# Patient Record
Sex: MALE | Race: WHITE | NOT HISPANIC OR LATINO | Employment: FULL TIME | URBAN - NONMETROPOLITAN AREA
[De-identification: names, ages, dates, MRNs, and addresses within clinical notes are randomized per-mention and may not be internally consistent; named-entity substitution may affect disease eponyms.]

---

## 2017-03-10 ENCOUNTER — ANESTHESIA (OUTPATIENT)
Dept: PERIOP | Facility: HOSPITAL | Age: 32
End: 2017-03-10

## 2017-03-10 ENCOUNTER — APPOINTMENT (OUTPATIENT)
Dept: GENERAL RADIOLOGY | Facility: HOSPITAL | Age: 32
End: 2017-03-10

## 2017-03-10 ENCOUNTER — APPOINTMENT (OUTPATIENT)
Dept: ULTRASOUND IMAGING | Facility: HOSPITAL | Age: 32
End: 2017-03-10

## 2017-03-10 ENCOUNTER — APPOINTMENT (OUTPATIENT)
Dept: CT IMAGING | Facility: HOSPITAL | Age: 32
End: 2017-03-10

## 2017-03-10 ENCOUNTER — HOSPITAL ENCOUNTER (INPATIENT)
Facility: HOSPITAL | Age: 32
LOS: 1 days | Discharge: HOME | End: 2017-03-11
Attending: FAMILY MEDICINE | Admitting: SURGERY

## 2017-03-10 ENCOUNTER — ANESTHESIA EVENT (OUTPATIENT)
Dept: PERIOP | Facility: HOSPITAL | Age: 32
End: 2017-03-10

## 2017-03-10 DIAGNOSIS — K81.0 ACUTE CHOLECYSTITIS: Primary | ICD-10-CM

## 2017-03-10 DIAGNOSIS — K80.00 CALCULUS OF GALLBLADDER WITH ACUTE CHOLECYSTITIS WITHOUT OBSTRUCTION: ICD-10-CM

## 2017-03-10 LAB
ALBUMIN SERPL-MCNC: 4.7 G/DL (ref 3.4–4.8)
ALBUMIN/GLOB SERPL: 1.7 G/DL (ref 1.1–1.8)
ALP SERPL-CCNC: 95 U/L (ref 38–126)
ALT SERPL W P-5'-P-CCNC: 60 U/L (ref 21–72)
AMPHET+METHAMPHET UR QL: NEGATIVE
ANION GAP SERPL CALCULATED.3IONS-SCNC: 13 MMOL/L (ref 5–15)
AST SERPL-CCNC: 30 U/L (ref 17–59)
BARBITURATES UR QL SCN: NEGATIVE
BASOPHILS # BLD AUTO: 0.01 10*3/MM3 (ref 0–0.2)
BASOPHILS NFR BLD AUTO: 0.1 % (ref 0–2)
BENZODIAZ UR QL SCN: NEGATIVE
BILIRUB SERPL-MCNC: 1.6 MG/DL (ref 0.2–1.3)
BUN BLD-MCNC: 15 MG/DL (ref 7–21)
BUN/CREAT SERPL: 19.5 (ref 7–25)
CALCIUM SPEC-SCNC: 9.7 MG/DL (ref 8.4–10.2)
CANNABINOIDS SERPL QL: NEGATIVE
CHLORIDE SERPL-SCNC: 100 MMOL/L (ref 95–110)
CO2 SERPL-SCNC: 26 MMOL/L (ref 22–31)
COCAINE UR QL: NEGATIVE
CREAT BLD-MCNC: 0.77 MG/DL (ref 0.7–1.3)
DEPRECATED RDW RBC AUTO: 38.8 FL (ref 35.1–43.9)
EOSINOPHIL # BLD AUTO: 0.01 10*3/MM3 (ref 0–0.7)
EOSINOPHIL NFR BLD AUTO: 0.1 % (ref 0–7)
ERYTHROCYTE [DISTWIDTH] IN BLOOD BY AUTOMATED COUNT: 12.9 % (ref 11.5–14.5)
ETHANOL BLD-MCNC: <10 MG/DL (ref 0–10)
ETHANOL UR QL: <0.01 %
GFR SERPL CREATININE-BSD FRML MDRD: 116 ML/MIN/1.73 (ref 70–162)
GLOBULIN UR ELPH-MCNC: 2.8 GM/DL (ref 2.3–3.5)
GLUCOSE BLD-MCNC: 130 MG/DL (ref 60–100)
HCT VFR BLD AUTO: 39.9 % (ref 39–49)
HGB BLD-MCNC: 13.8 G/DL (ref 13.7–17.3)
HOLD SPECIMEN: NORMAL
HOLD SPECIMEN: NORMAL
IMM GRANULOCYTES # BLD: 0.02 10*3/MM3 (ref 0–0.02)
IMM GRANULOCYTES NFR BLD: 0.2 % (ref 0–0.5)
LIPASE SERPL-CCNC: 47 U/L (ref 23–300)
LYMPHOCYTES # BLD AUTO: 0.96 10*3/MM3 (ref 0.6–4.2)
LYMPHOCYTES NFR BLD AUTO: 8.1 % (ref 10–50)
MCH RBC QN AUTO: 28.3 PG (ref 26.5–34)
MCHC RBC AUTO-ENTMCNC: 34.6 G/DL (ref 31.5–36.3)
MCV RBC AUTO: 81.8 FL (ref 80–98)
METHADONE UR QL SCN: NEGATIVE
MONOCYTES # BLD AUTO: 0.36 10*3/MM3 (ref 0–0.9)
MONOCYTES NFR BLD AUTO: 3.1 % (ref 0–12)
NEUTROPHILS # BLD AUTO: 10.42 10*3/MM3 (ref 2–8.6)
NEUTROPHILS NFR BLD AUTO: 88.4 % (ref 37–80)
OPIATES UR QL: POSITIVE
OXYCODONE UR QL SCN: NEGATIVE
PLATELET # BLD AUTO: 224 10*3/MM3 (ref 150–450)
PMV BLD AUTO: 10 FL (ref 8–12)
POTASSIUM BLD-SCNC: 4.4 MMOL/L (ref 3.5–5.1)
PROT SERPL-MCNC: 7.5 G/DL (ref 6.3–8.6)
RBC # BLD AUTO: 4.88 10*6/MM3 (ref 4.37–5.74)
SODIUM BLD-SCNC: 139 MMOL/L (ref 137–145)
WBC NRBC COR # BLD: 11.78 10*3/MM3 (ref 3.2–9.8)
WHOLE BLOOD HOLD SPECIMEN: NORMAL
WHOLE BLOOD HOLD SPECIMEN: NORMAL

## 2017-03-10 PROCEDURE — 25010000002 KETOROLAC TROMETHAMINE PER 15 MG: Performed by: NURSE PRACTITIONER

## 2017-03-10 PROCEDURE — 80307 DRUG TEST PRSMV CHEM ANLYZR: CPT | Performed by: NURSE PRACTITIONER

## 2017-03-10 PROCEDURE — 85025 COMPLETE CBC W/AUTO DIFF WBC: CPT | Performed by: FAMILY MEDICINE

## 2017-03-10 PROCEDURE — 93005 ELECTROCARDIOGRAM TRACING: CPT | Performed by: NURSE PRACTITIONER

## 2017-03-10 PROCEDURE — 0FT44ZZ RESECTION OF GALLBLADDER, PERCUTANEOUS ENDOSCOPIC APPROACH: ICD-10-PCS | Performed by: SURGERY

## 2017-03-10 PROCEDURE — 25010000002 NEOSTIGMINE PER 0.5 MG: Performed by: NURSE ANESTHETIST, CERTIFIED REGISTERED

## 2017-03-10 PROCEDURE — 76000 FLUOROSCOPY <1 HR PHYS/QHP: CPT

## 2017-03-10 PROCEDURE — 25010000002 SUCCINYLCHOLINE PER 20 MG: Performed by: NURSE ANESTHETIST, CERTIFIED REGISTERED

## 2017-03-10 PROCEDURE — 99284 EMERGENCY DEPT VISIT MOD MDM: CPT

## 2017-03-10 PROCEDURE — 25010000002 MIDAZOLAM PER 1 MG: Performed by: NURSE ANESTHETIST, CERTIFIED REGISTERED

## 2017-03-10 PROCEDURE — 47563 LAPARO CHOLECYSTECTOMY/GRAPH: CPT | Performed by: SURGERY

## 2017-03-10 PROCEDURE — 76705 ECHO EXAM OF ABDOMEN: CPT

## 2017-03-10 PROCEDURE — 88304 TISSUE EXAM BY PATHOLOGIST: CPT | Performed by: SURGERY

## 2017-03-10 PROCEDURE — 71020 HC CHEST PA AND LATERAL: CPT

## 2017-03-10 PROCEDURE — 74176 CT ABD & PELVIS W/O CONTRAST: CPT

## 2017-03-10 PROCEDURE — 25010000002 FENTANYL CITRATE (PF) 100 MCG/2ML SOLUTION: Performed by: NURSE ANESTHETIST, CERTIFIED REGISTERED

## 2017-03-10 PROCEDURE — 0 IOPAMIDOL 61 % SOLUTION: Performed by: SURGERY

## 2017-03-10 PROCEDURE — 80307 DRUG TEST PRSMV CHEM ANLYZR: CPT | Performed by: FAMILY MEDICINE

## 2017-03-10 PROCEDURE — 99219 PR INITIAL OBSERVATION CARE/DAY 50 MINUTES: CPT | Performed by: SURGERY

## 2017-03-10 PROCEDURE — 25010000002 HYDROMORPHONE PER 4 MG: Performed by: NURSE ANESTHETIST, CERTIFIED REGISTERED

## 2017-03-10 PROCEDURE — 25010000002 ONDANSETRON PER 1 MG: Performed by: NURSE PRACTITIONER

## 2017-03-10 PROCEDURE — 93010 ELECTROCARDIOGRAM REPORT: CPT | Performed by: INTERNAL MEDICINE

## 2017-03-10 PROCEDURE — 25010000002 ONDANSETRON PER 1 MG: Performed by: NURSE ANESTHETIST, CERTIFIED REGISTERED

## 2017-03-10 PROCEDURE — 25010000002 CEFOXITIN: Performed by: SURGERY

## 2017-03-10 PROCEDURE — BF101ZZ FLUOROSCOPY OF BILE DUCTS USING LOW OSMOLAR CONTRAST: ICD-10-PCS | Performed by: SURGERY

## 2017-03-10 PROCEDURE — 88304 TISSUE EXAM BY PATHOLOGIST: CPT | Performed by: PATHOLOGY

## 2017-03-10 PROCEDURE — 80053 COMPREHEN METABOLIC PANEL: CPT | Performed by: FAMILY MEDICINE

## 2017-03-10 PROCEDURE — 25010000002 PROPOFOL 10 MG/ML EMULSION: Performed by: NURSE ANESTHETIST, CERTIFIED REGISTERED

## 2017-03-10 PROCEDURE — 83690 ASSAY OF LIPASE: CPT | Performed by: FAMILY MEDICINE

## 2017-03-10 RX ORDER — SODIUM CHLORIDE 0.9 % (FLUSH) 0.9 %
1-10 SYRINGE (ML) INJECTION AS NEEDED
Status: DISCONTINUED | OUTPATIENT
Start: 2017-03-10 | End: 2017-03-11 | Stop reason: HOSPADM

## 2017-03-10 RX ORDER — ATROPINE SULFATE 1 MG/ML
INJECTION, SOLUTION INTRAMUSCULAR; INTRAVENOUS; SUBCUTANEOUS AS NEEDED
Status: DISCONTINUED | OUTPATIENT
Start: 2017-03-10 | End: 2017-03-10 | Stop reason: SURG

## 2017-03-10 RX ORDER — ATROPINE SULFATE 1 MG/ML
INJECTION, SOLUTION INTRAMUSCULAR; INTRAVENOUS; SUBCUTANEOUS AS NEEDED
Status: DISCONTINUED | OUTPATIENT
Start: 2017-03-10 | End: 2017-03-10

## 2017-03-10 RX ORDER — HYDRALAZINE HYDROCHLORIDE 20 MG/ML
5 INJECTION INTRAMUSCULAR; INTRAVENOUS
Status: DISCONTINUED | OUTPATIENT
Start: 2017-03-10 | End: 2017-03-10 | Stop reason: HOSPADM

## 2017-03-10 RX ORDER — DEXAMETHASONE SODIUM PHOSPHATE 4 MG/ML
8 INJECTION, SOLUTION INTRA-ARTICULAR; INTRALESIONAL; INTRAMUSCULAR; INTRAVENOUS; SOFT TISSUE ONCE AS NEEDED
Status: DISCONTINUED | OUTPATIENT
Start: 2017-03-10 | End: 2017-03-10 | Stop reason: HOSPADM

## 2017-03-10 RX ORDER — DIPHENHYDRAMINE HYDROCHLORIDE 50 MG/ML
12.5 INJECTION INTRAMUSCULAR; INTRAVENOUS
Status: DISCONTINUED | OUTPATIENT
Start: 2017-03-10 | End: 2017-03-10 | Stop reason: HOSPADM

## 2017-03-10 RX ORDER — HYDROCODONE BITARTRATE AND ACETAMINOPHEN 7.5; 325 MG/1; MG/1
1 TABLET ORAL EVERY 6 HOURS PRN
COMMUNITY
End: 2017-03-17 | Stop reason: SDUPTHER

## 2017-03-10 RX ORDER — LABETALOL HYDROCHLORIDE 5 MG/ML
5 INJECTION, SOLUTION INTRAVENOUS
Status: DISCONTINUED | OUTPATIENT
Start: 2017-03-10 | End: 2017-03-10 | Stop reason: HOSPADM

## 2017-03-10 RX ORDER — HYDROMORPHONE HCL 110MG/55ML
PATIENT CONTROLLED ANALGESIA SYRINGE INTRAVENOUS AS NEEDED
Status: DISCONTINUED | OUTPATIENT
Start: 2017-03-10 | End: 2017-03-10 | Stop reason: SURG

## 2017-03-10 RX ORDER — ACETAMINOPHEN 325 MG/1
650 TABLET ORAL ONCE AS NEEDED
Status: DISCONTINUED | OUTPATIENT
Start: 2017-03-10 | End: 2017-03-10 | Stop reason: HOSPADM

## 2017-03-10 RX ORDER — NALOXONE HCL 0.4 MG/ML
0.4 VIAL (ML) INJECTION
Status: DISCONTINUED | OUTPATIENT
Start: 2017-03-10 | End: 2017-03-11 | Stop reason: HOSPADM

## 2017-03-10 RX ORDER — ROCURONIUM BROMIDE 10 MG/ML
INJECTION, SOLUTION INTRAVENOUS AS NEEDED
Status: DISCONTINUED | OUTPATIENT
Start: 2017-03-10 | End: 2017-03-10 | Stop reason: SURG

## 2017-03-10 RX ORDER — BACTERIOSTATIC SODIUM CHLORIDE 0.9 %
VIAL (ML) INJECTION AS NEEDED
Status: DISCONTINUED | OUTPATIENT
Start: 2017-03-10 | End: 2017-03-11 | Stop reason: HOSPADM

## 2017-03-10 RX ORDER — BUPIVACAINE HYDROCHLORIDE AND EPINEPHRINE 5; 5 MG/ML; UG/ML
INJECTION, SOLUTION EPIDURAL; INTRACAUDAL; PERINEURAL AS NEEDED
Status: DISCONTINUED | OUTPATIENT
Start: 2017-03-10 | End: 2017-03-11 | Stop reason: HOSPADM

## 2017-03-10 RX ORDER — SODIUM CHLORIDE 0.9 % (FLUSH) 0.9 %
10 SYRINGE (ML) INJECTION AS NEEDED
Status: DISCONTINUED | OUTPATIENT
Start: 2017-03-10 | End: 2017-03-11 | Stop reason: HOSPADM

## 2017-03-10 RX ORDER — GABAPENTIN 300 MG/1
300 CAPSULE ORAL 2 TIMES DAILY PRN
COMMUNITY
End: 2019-06-21

## 2017-03-10 RX ORDER — PROPOFOL 10 MG/ML
VIAL (ML) INTRAVENOUS AS NEEDED
Status: DISCONTINUED | OUTPATIENT
Start: 2017-03-10 | End: 2017-03-10 | Stop reason: SURG

## 2017-03-10 RX ORDER — ONDANSETRON 2 MG/ML
4 INJECTION INTRAMUSCULAR; INTRAVENOUS ONCE
Status: COMPLETED | OUTPATIENT
Start: 2017-03-10 | End: 2017-03-10

## 2017-03-10 RX ORDER — FENTANYL CITRATE 50 UG/ML
INJECTION, SOLUTION INTRAMUSCULAR; INTRAVENOUS AS NEEDED
Status: DISCONTINUED | OUTPATIENT
Start: 2017-03-10 | End: 2017-03-10 | Stop reason: SURG

## 2017-03-10 RX ORDER — MIDAZOLAM HYDROCHLORIDE 1 MG/ML
INJECTION INTRAMUSCULAR; INTRAVENOUS AS NEEDED
Status: DISCONTINUED | OUTPATIENT
Start: 2017-03-10 | End: 2017-03-10 | Stop reason: SURG

## 2017-03-10 RX ORDER — NALOXONE HCL 0.4 MG/ML
0.2 VIAL (ML) INJECTION AS NEEDED
Status: DISCONTINUED | OUTPATIENT
Start: 2017-03-10 | End: 2017-03-10 | Stop reason: HOSPADM

## 2017-03-10 RX ORDER — DEXTROSE, SODIUM CHLORIDE, AND POTASSIUM CHLORIDE 5; .45; .15 G/100ML; G/100ML; G/100ML
100 INJECTION INTRAVENOUS CONTINUOUS
Status: DISCONTINUED | OUTPATIENT
Start: 2017-03-10 | End: 2017-03-11 | Stop reason: HOSPADM

## 2017-03-10 RX ORDER — LIDOCAINE HYDROCHLORIDE 20 MG/ML
INJECTION, SOLUTION INFILTRATION; PERINEURAL AS NEEDED
Status: DISCONTINUED | OUTPATIENT
Start: 2017-03-10 | End: 2017-03-10 | Stop reason: SURG

## 2017-03-10 RX ORDER — FLUMAZENIL 0.1 MG/ML
0.2 INJECTION INTRAVENOUS AS NEEDED
Status: DISCONTINUED | OUTPATIENT
Start: 2017-03-10 | End: 2017-03-10 | Stop reason: HOSPADM

## 2017-03-10 RX ORDER — GLYCOPYRROLATE 0.2 MG/ML
INJECTION INTRAMUSCULAR; INTRAVENOUS AS NEEDED
Status: DISCONTINUED | OUTPATIENT
Start: 2017-03-10 | End: 2017-03-10 | Stop reason: SURG

## 2017-03-10 RX ORDER — ONDANSETRON 2 MG/ML
INJECTION INTRAMUSCULAR; INTRAVENOUS AS NEEDED
Status: DISCONTINUED | OUTPATIENT
Start: 2017-03-10 | End: 2017-03-10 | Stop reason: SURG

## 2017-03-10 RX ORDER — HYDROCODONE BITARTRATE AND ACETAMINOPHEN 7.5; 325 MG/1; MG/1
2 TABLET ORAL EVERY 6 HOURS PRN
Status: DISCONTINUED | OUTPATIENT
Start: 2017-03-10 | End: 2017-03-11 | Stop reason: HOSPADM

## 2017-03-10 RX ORDER — SODIUM CHLORIDE, SODIUM GLUCONATE, SODIUM ACETATE, POTASSIUM CHLORIDE, AND MAGNESIUM CHLORIDE 526; 502; 368; 37; 30 MG/100ML; MG/100ML; MG/100ML; MG/100ML; MG/100ML
INJECTION, SOLUTION INTRAVENOUS CONTINUOUS PRN
Status: DISCONTINUED | OUTPATIENT
Start: 2017-03-10 | End: 2017-03-10 | Stop reason: SURG

## 2017-03-10 RX ORDER — KETOROLAC TROMETHAMINE 30 MG/ML
30 INJECTION, SOLUTION INTRAMUSCULAR; INTRAVENOUS ONCE
Status: COMPLETED | OUTPATIENT
Start: 2017-03-10 | End: 2017-03-10

## 2017-03-10 RX ORDER — ACETAMINOPHEN 650 MG/1
650 SUPPOSITORY RECTAL ONCE AS NEEDED
Status: DISCONTINUED | OUTPATIENT
Start: 2017-03-10 | End: 2017-03-10 | Stop reason: HOSPADM

## 2017-03-10 RX ORDER — SUCCINYLCHOLINE CHLORIDE 20 MG/ML
INJECTION INTRAMUSCULAR; INTRAVENOUS AS NEEDED
Status: DISCONTINUED | OUTPATIENT
Start: 2017-03-10 | End: 2017-03-10 | Stop reason: SURG

## 2017-03-10 RX ORDER — METOCLOPRAMIDE HYDROCHLORIDE 5 MG/ML
10 INJECTION INTRAMUSCULAR; INTRAVENOUS ONCE AS NEEDED
Status: DISCONTINUED | OUTPATIENT
Start: 2017-03-10 | End: 2017-03-10 | Stop reason: HOSPADM

## 2017-03-10 RX ORDER — ONDANSETRON 2 MG/ML
4 INJECTION INTRAMUSCULAR; INTRAVENOUS ONCE AS NEEDED
Status: DISCONTINUED | OUTPATIENT
Start: 2017-03-10 | End: 2017-03-10 | Stop reason: HOSPADM

## 2017-03-10 RX ORDER — ONDANSETRON 2 MG/ML
4 INJECTION INTRAMUSCULAR; INTRAVENOUS EVERY 6 HOURS PRN
Status: DISCONTINUED | OUTPATIENT
Start: 2017-03-10 | End: 2017-03-11 | Stop reason: HOSPADM

## 2017-03-10 RX ORDER — MORPHINE SULFATE 4 MG/ML
4 INJECTION, SOLUTION INTRAMUSCULAR; INTRAVENOUS EVERY 4 HOURS PRN
Status: DISCONTINUED | OUTPATIENT
Start: 2017-03-10 | End: 2017-03-11 | Stop reason: HOSPADM

## 2017-03-10 RX ADMIN — PROPOFOL 200 MG: 10 INJECTION, EMULSION INTRAVENOUS at 16:02

## 2017-03-10 RX ADMIN — CEFOXITIN 2 G: 2 INJECTION, POWDER, FOR SOLUTION INTRAVENOUS at 22:20

## 2017-03-10 RX ADMIN — HYDROMORPHONE HYDROCHLORIDE 0.5 MG: 2 INJECTION, SOLUTION INTRAMUSCULAR; INTRAVENOUS; SUBCUTANEOUS at 17:10

## 2017-03-10 RX ADMIN — SODIUM CHLORIDE 600 ML: 900 INJECTION, SOLUTION INTRAVENOUS at 16:40

## 2017-03-10 RX ADMIN — ROCURONIUM BROMIDE 5 MG: 10 INJECTION INTRAVENOUS at 16:00

## 2017-03-10 RX ADMIN — SODIUM CHLORIDE, SODIUM GLUCONATE, SODIUM ACETATE, POTASSIUM CHLORIDE, AND MAGNESIUM CHLORIDE: 526; 502; 368; 37; 30 INJECTION, SOLUTION INTRAVENOUS at 17:05

## 2017-03-10 RX ADMIN — SODIUM CHLORIDE 100 ML: 900 INJECTION, SOLUTION INTRAVENOUS at 15:59

## 2017-03-10 RX ADMIN — ONDANSETRON 4 MG: 2 INJECTION INTRAMUSCULAR; INTRAVENOUS at 12:57

## 2017-03-10 RX ADMIN — ONDANSETRON 4 MG: 2 INJECTION INTRAMUSCULAR; INTRAVENOUS at 16:10

## 2017-03-10 RX ADMIN — FENTANYL CITRATE 100 MCG: 50 INJECTION, SOLUTION INTRAMUSCULAR; INTRAVENOUS at 16:20

## 2017-03-10 RX ADMIN — MIDAZOLAM 2 MG: 1 INJECTION INTRAMUSCULAR; INTRAVENOUS at 16:02

## 2017-03-10 RX ADMIN — SODIUM CHLORIDE 200 ML: 900 INJECTION, SOLUTION INTRAVENOUS at 17:00

## 2017-03-10 RX ADMIN — FENTANYL CITRATE 150 MCG: 50 INJECTION, SOLUTION INTRAMUSCULAR; INTRAVENOUS at 16:02

## 2017-03-10 RX ADMIN — POTASSIUM CHLORIDE, DEXTROSE MONOHYDRATE AND SODIUM CHLORIDE 100 ML/HR: 150; 5; 450 INJECTION, SOLUTION INTRAVENOUS at 21:28

## 2017-03-10 RX ADMIN — ATROPINE SULFATE 1 MG: 1 INJECTION, SOLUTION INTRAMUSCULAR; INTRAVENOUS; SUBCUTANEOUS at 16:18

## 2017-03-10 RX ADMIN — SODIUM CHLORIDE 100 ML: 900 INJECTION, SOLUTION INTRAVENOUS at 17:05

## 2017-03-10 RX ADMIN — GLYCOPYRROLATE 0.2 MG: 0.2 INJECTION, SOLUTION INTRAMUSCULAR; INTRAVENOUS at 16:15

## 2017-03-10 RX ADMIN — NEOSTIGMINE METHYLSULFATE 3 MG: 1 INJECTION, SOLUTION INTRAMUSCULAR; INTRAVENOUS; SUBCUTANEOUS at 17:00

## 2017-03-10 RX ADMIN — HYDROCODONE BITARTRATE AND ACETAMINOPHEN 2 TABLET: 7.5; 325 TABLET ORAL at 21:34

## 2017-03-10 RX ADMIN — ROCURONIUM BROMIDE 10 MG: 10 INJECTION INTRAVENOUS at 16:23

## 2017-03-10 RX ADMIN — SODIUM CHLORIDE 1000 ML: 900 INJECTION, SOLUTION INTRAVENOUS at 12:57

## 2017-03-10 RX ADMIN — LIDOCAINE HYDROCHLORIDE 80 MG: 20 INJECTION, SOLUTION INFILTRATION; PERINEURAL at 16:02

## 2017-03-10 RX ADMIN — SUCCINYLCHOLINE CHLORIDE 180 MG: 20 INJECTION, SOLUTION INTRAMUSCULAR; INTRAVENOUS at 16:02

## 2017-03-10 RX ADMIN — HYDROMORPHONE HYDROCHLORIDE 0.5 MG: 2 INJECTION, SOLUTION INTRAMUSCULAR; INTRAVENOUS; SUBCUTANEOUS at 17:05

## 2017-03-10 RX ADMIN — ROCURONIUM BROMIDE 30 MG: 10 INJECTION INTRAVENOUS at 16:10

## 2017-03-10 RX ADMIN — KETOROLAC TROMETHAMINE 30 MG: 30 INJECTION, SOLUTION INTRAMUSCULAR; INTRAVENOUS at 12:57

## 2017-03-10 RX ADMIN — GLYCOPYRROLATE 0.4 MG: 0.2 INJECTION, SOLUTION INTRAMUSCULAR; INTRAVENOUS at 17:00

## 2017-03-10 NOTE — H&P
Patient Care Team:  No Known Provider as PCP - General  No ref. provider found  Chief complaint abdominal pain.    Subjective     Patient is a 32 y.o. male presents with 12 hour history of severe right upper quadrant abdominal pain.  This is never happened before.  He has no nausea or vomiting.  He is not hungry.  The pain coming goes to his back and flank.  It is a constant pain.  Pain medication has helped the pain some.  No change in his bowel function.    Review of Systems   Review of Systems   Constitutional: Negative.    HENT: Negative.    Eyes: Negative.    Respiratory: Negative.    Cardiovascular: Negative.    Gastrointestinal: Positive for abdominal pain.   Endocrine: Negative.    Genitourinary: Negative.    Musculoskeletal: Negative.    Skin: Negative.    Allergic/Immunologic: Negative.    Neurological: Negative.    Hematological: Negative.    Psychiatric/Behavioral: Negative.      History  History reviewed. No pertinent past medical history.  Past Surgical History   Procedure Laterality Date   • Orbital fracture surgery       History reviewed. No pertinent family history.  Social History   Substance Use Topics   • Smoking status: Current Every Day Smoker     Packs/day: 0.50   • Smokeless tobacco: None   • Alcohol use 7.2 oz/week     12 Cans of beer per week       (Not in a hospital admission)  Allergies:  Review of patient's allergies indicates no known allergies.    Objective     Vital Signs  Temp:  [97.7 °F (36.5 °C)] 97.7 °F (36.5 °C)  Heart Rate:  [48-52] 52  Resp:  [16-18] 18  BP: (132-146)/(67-83) 132/67    Physical Exam:    Physical Exam   Constitutional: He is oriented to person, place, and time. He appears well-developed.   HENT:   Head: Normocephalic and atraumatic.   Eyes: Pupils are equal, round, and reactive to light.   Neck: Normal range of motion. Neck supple.   Cardiovascular: Normal rate, regular rhythm, normal heart sounds and intact distal pulses.    Pulmonary/Chest: Effort normal  and breath sounds normal.   Abdominal: Soft. Bowel sounds are normal. There is tenderness.   Musculoskeletal: Normal range of motion.   Neurological: He is alert and oriented to person, place, and time.   Skin: Skin is warm and dry.   Psychiatric: He has a normal mood and affect. His behavior is normal.    tenderness to deep palpation in the right upper quadrant with a positive Parsons sign.    Results Review:      Lab Results (last 24 hours)     Procedure Component Value Units Date/Time    [place] Draw [[id number]] Collected:  03/10/17 1157    Specimen:  Blood Updated:  03/10/17 1200    Narrative:       The following orders were created for panel order [place] Draw.  Procedure                               Abnormality         Status                     ---------                               -----------         ------                     Light Blue Top[[id number]]                                    In process                 Green Top (Gel)[[id number]]                                   In process                 Lavender Top[[id number]]                                      In process                 Gold Top - SST[[id number]]                                    In process                   Please view results for these tests on the individual orders.    Light Blue Top [[id number]] Collected:  03/10/17 1157    Specimen:  Blood Updated:  03/10/17 1200    Green Top (Gel) [[id number]] Collected:  03/10/17 1157    Specimen:  Blood Updated:  03/10/17 1200    Gold Top - SST [[id number]] Collected:  03/10/17 1157    Specimen:  Blood Updated:  03/10/17 1200    Lavender Top [[id number]] Collected:  03/10/17 1157    Specimen:  Blood Updated:  03/10/17 1200    CBC & Differential [[id number]] Collected:  03/10/17 1157    Specimen:  Blood Updated:  03/10/17 1202    Narrative:       The following orders were created for panel order CBC & Differential.  Procedure                               Abnormality         Status                     ---------                                -----------         ------                     CBC Auto Differential[[id number]]         Abnormal            Final result                 Please view results for these tests on the individual orders.    CBC Auto Differential [[id number]]  (Abnormal) Collected:  03/10/17 1157    Specimen:  Blood Updated:  03/10/17 1202     WBC 11.78 (H) 10*3/mm3      RBC 4.88 10*6/mm3      Hemoglobin 13.8 g/dL      Hematocrit 39.9 %      MCV 81.8 fL      MCH 28.3 pg      MCHC 34.6 g/dL      RDW 12.9 %      RDW-SD 38.8 fl      MPV 10.0 fL      Platelets 224 10*3/mm3      Neutrophil % 88.4 (H) %      Lymphocyte % 8.1 (L) %      Monocyte % 3.1 %      Eosinophil % 0.1 %      Basophil % 0.1 %      Immature Grans % 0.2 %      Neutrophils, Absolute 10.42 (H) 10*3/mm3      Lymphocytes, Absolute 0.96 10*3/mm3      Monocytes, Absolute 0.36 10*3/mm3      Eosinophils, Absolute 0.01 10*3/mm3      Basophils, Absolute 0.01 10*3/mm3      Immature Grans, Absolute 0.02 10*3/mm3     Comprehensive Metabolic Panel [[id number]]  (Abnormal) Collected:  03/10/17 1157    Specimen:  Blood Updated:  03/10/17 1227     Glucose 130 (H) mg/dL      BUN 15 mg/dL      Creatinine 0.77 mg/dL      Sodium 139 mmol/L      Potassium 4.4 mmol/L      Chloride 100 mmol/L      CO2 26.0 mmol/L      Calcium 9.7 mg/dL      Total Protein 7.5 g/dL      Albumin 4.70 g/dL      ALT (SGPT) 60 U/L      AST (SGOT) 30 U/L      Alkaline Phosphatase 95 U/L      Total Bilirubin 1.6 (H) mg/dL      eGFR Non African Amer 116 mL/min/1.73      Globulin 2.8 gm/dL      A/G Ratio 1.7 g/dL      BUN/Creatinine Ratio 19.5      Anion Gap 13.0 mmol/L     Lipase [[id number]]  (Normal) Collected:  03/10/17 1157    Specimen:  Blood Updated:  03/10/17 1227     Lipase 47 U/L     Urine Drug Screen [[id number]]  (Abnormal) Collected:  03/10/17 1258    Specimen:  Urine from Urine, Clean Catch Updated:  03/10/17 1333     Amphetamine Screen, Urine Negative      Barbiturates Screen, Urine Negative       Benzodiazepine Screen, Urine Negative      Cocaine Screen, Urine Negative      Methadone Screen, Urine Negative      Opiate Screen Positive (A)      Oxycodone Screen, Urine Negative      THC, Screen, Urine Negative     Narrative:       Negative Thresholds For Drugs Screened in Urine:     Amphetamines          500 ng/ml  Barbiturates          200 ng/ml  Benzodiazepines       200 ng/ml  Cocaine               150 ng/ml  Methadone             300 ng/mL  Opiates               300 ng/mL  Oxycodone             100 ng/mL  THC                   20 ng/mL    The normal value for all drugs tested is negative. This report includes final unconfirmed screening results.  A positive result by this assay can be, at your request, sent to the Reference Lab for confirmation by gas chromatography. Unconfirmed results must not be used for non-medical purposes, such as employment or legal testing. Clinical consideration should be applied to any drug of abuse test result, particularly when unconfirmed results are used.    Ethanol [[id number]] Collected:  03/10/17 1157    Specimen:  Blood Updated:  03/10/17 1425     Ethanol <10 mg/dL      Ethanol % <0.010 %        labs are significant of the total bilirubin 1.6.  CT scan is normal with exception of a possible cholelithiasis.  Next line gallbladder ultrasound was done to confirm and patient has multiple gallstones.  He has mild gallbladder wall thickening without any fluid.  He has a normal common duct diameter.    Assessment/Plan     Active Problems:    Acute cholecystitis    Mr. Kowalski is a 32-year-old gentleman presents with signs symptoms of acute cholecystitis.  We'll give him IV fluids IV antibiotics and taken to the OR for lap cholecystectomy.  He we will do a cholangiogram.  After unexpected at a 1 day hospital stay for symptom control.  The procedure and risks, benefits, and alternatives to the plan him discussed patient and he understands and agrees with the plan.    I discussed the  patients findings and my recommendations with patient and family.     Nelson Ware MD  03/10/17  3:16 PM

## 2017-03-10 NOTE — PLAN OF CARE
Problem: Patient Care Overview (Adult)  Goal: Plan of Care Review  Outcome: Ongoing (interventions implemented as appropriate)    03/10/17 1730   Coping/Psychosocial Response Interventions   Plan Of Care Reviewed With patient   Patient Care Overview   Progress improving   Outcome Evaluation   Outcome Summary/Follow up Plan VSS, pt meet pacu d/c criteria       Goal: Adult Individualization and Mutuality  Outcome: Ongoing (interventions implemented as appropriate)

## 2017-03-10 NOTE — ANESTHESIA PROCEDURE NOTES
Airway  Urgency: emergent    Airway not difficult    General Information and Staff    Patient location during procedure: OR  CRNA: NILA GARCES    Indications and Patient Condition  Indications for airway management: airway protection    Preoxygenated: yes  Mask difficulty assessment: 0 - not attempted    Final Airway Details  Final airway type: endotracheal airway      Successful airway: ETT  Cuffed: yes   Successful intubation technique: direct laryngoscopy  Facilitating devices/methods: intubating stylet and cricoid pressure  Blade: Aneta  Blade size: #4  ETT size: 7.5 mm  Cormack-Lehane Classification: grade I - full view of glottis  Placement verified by: chest auscultation and capnometry   Measured from: lips  ETT to lips (cm): 22  Number of attempts at approach: 1

## 2017-03-10 NOTE — OP NOTE
CHOLECYSTECTOMY LAPAROSCOPIC  Procedure Note    Tam Kowalski  3/10/2017    Pre-op Diagnosis:   Acute cholecystitis [K81.0]    Post-op Diagnosis:     Post-Op Diagnosis Codes:     * Acute cholecystitis [K81.0]    Procedure/CPT® Codes:  SD LAP,CHOLECYSTECTOMY/GRAPH [08519]    Procedure(s):  CHOLECYSTECTOMY LAPAROSCOPIC, INTRA OPERATIVE CHOLANGIOGRAM    Surgeon(s):  Nelson Ware MD    Anesthesia: General    Staff:   Circulator: Cheyenne Graves RN; Tabby Freeman RN  Scrub Person: Darleen Kowalski  Assistant: Naida Mina CSA    Estimated Blood Loss: * No values recorded between 3/10/2017  3:56 PM and 3/10/2017  5:22 PM *    Specimens:                  ID Type Source Tests Collected by Time Destination   A :  Tissue Gallbladder TISSUE EXAM Nelson Ware MD 3/10/2017 1703          Drains:           Findings: critical view of safety obtained, gallbladder hydrops, acute cholecystitis, IOC showed contrast in the duodenum and no stones    Complications: none     Op note: After consent was obtained patient was taken to the operating room where general endotracheal anesthesia was induced.  Perioperative antibiotics were given and SCDs were placed.  The abdomen was then prepped and draped in normal sterile fashion.  We performed a timeout.  We started with a supraumbilical Min technique.  This is successful and the trochar was placed and we achieved pneumoperitoneum.  We then inserted a camera and placed 3 additional 5 mm ports in the epigastric, right upper quadrant, and flank positions.  We then grasped the fundus and infundibulum of the gallbladder and began our dissection.  All peritoneum and fat was cleared from the critical structures.  The critical view of safety was obtained and the cystic duct, cystic artery, and cystic plate were all identified.  Cholangiogram was performed with a Delgado clamp with good picture and contast filling the duodenum.  Then placed clips once distally and twice proximally on both the  cystic artery and cystic duct and these were both transected.  The cystic duct was large so a endoloop was also used.  Next used the hook electrocautery to take the gallbladder off the liver bed.  This was done safely with minimal bleeding and bile spillage.  After that the gallbladder was placed in the Endo Catch bag and removed from the abdomen.  Then surveilled the rest of the abdomen and there is no excessive blood or bile noted.  The Morison's pouch was washed out and our surgical site looks clean.  The 3 trochars removed under direct vision and all trochars and instruments removed from the abdomen.  We then closed the umbilical site with 0 Vicryl sutures and skin was approximated with 4-0 Monocryl and skin affix.  The procedure terminated patient tolerated the procedure well.    Nelson Ware MD     Date: 3/10/2017  Time: 5:22 PM

## 2017-03-10 NOTE — PLAN OF CARE
Problem: Perioperative Period (Adult)  Goal: Signs and Symptoms of Listed Potential Problems Will be Absent or Manageable (Perioperative Period)  Outcome: Ongoing (interventions implemented as appropriate)    03/10/17 7880   Perioperative Period   Problems Assessed (Perioperative Period) all   Problems Present (Perioperative Period) none

## 2017-03-10 NOTE — ANESTHESIA PREPROCEDURE EVALUATION
Anesthesia Evaluation     Patient summary reviewed   NPO Status: > 8 hours   Airway   Mallampati: II  no difficulty expected  Dental - normal exam     Pulmonary - normal exam   Cardiovascular - normal exam        Neuro/Psych  GI/Hepatic/Renal/Endo      Musculoskeletal     (+) back pain,   Abdominal    Substance History      OB/GYN          Other                                    Anesthesia Plan    ASA 2     general     Anesthetic plan and risks discussed with patient.

## 2017-03-10 NOTE — ED PROVIDER NOTES
Subjective   Patient is a 32 y.o. male presenting with abdominal pain.   History provided by:  Patient  Abdominal Pain   Pain location:  RUQ  Pain quality: sharp    Pain radiation: right lateral chest.  Pain severity:  Severe  Onset quality:  Sudden  Duration:  1 day  Timing:  Constant  Progression:  Worsening  Chronicity:  New  Relieved by:  Nothing  Worsened by:  Nothing  Ineffective treatments:  None tried  Associated symptoms: nausea and vomiting    Associated symptoms: no chest pain, no chills, no constipation, no cough, no fatigue, no fever and no shortness of breath    Nausea:     Severity:  Moderate    Onset quality:  Sudden    Timing:  Intermittent    Progression:  Waxing and waning  Vomiting:     Severity:  Moderate    Duration:  1 day    Timing:  Constant    Progression:  Worsening (unable to keep anything down)      Review of Systems   Constitutional: Positive for appetite change (unable to keep anything down). Negative for activity change, chills, fatigue and fever.   Respiratory: Negative.  Negative for cough, shortness of breath and wheezing.    Cardiovascular: Negative for chest pain, palpitations and leg swelling.   Gastrointestinal: Positive for abdominal pain, nausea and vomiting. Negative for constipation.   Genitourinary: Negative.    Musculoskeletal: Negative.    Neurological: Negative.    Psychiatric/Behavioral: Negative.    All other systems reviewed and are negative.      History reviewed. No pertinent past medical history.    No Known Allergies    Past Surgical History   Procedure Laterality Date   • Orbital fracture surgery         History reviewed. No pertinent family history.    Social History     Social History   • Marital status:      Spouse name: N/A   • Number of children: N/A   • Years of education: N/A     Social History Main Topics   • Smoking status: Current Every Day Smoker     Packs/day: 0.50   • Smokeless tobacco: None   • Alcohol use 7.2 oz/week     12 Cans of beer per  week   • Drug use: No   • Sexual activity: Defer     Other Topics Concern   • None     Social History Narrative   • None           Objective   Physical Exam   Constitutional: He is oriented to person, place, and time.   Cardiovascular: Regular rhythm, normal heart sounds and intact distal pulses.  Bradycardia present.    Pulmonary/Chest: Effort normal and breath sounds normal.   Abdominal: Soft. Bowel sounds are normal. There is tenderness.       Musculoskeletal: Normal range of motion.   Neurological: He is alert and oriented to person, place, and time. He has normal reflexes.   Skin: Skin is warm and dry.   Psychiatric: He has a normal mood and affect. His behavior is normal. Judgment and thought content normal.   Nursing note and vitals reviewed.      ECG 12 Lead    Date/Time: 3/10/2017 12:36 PM  Performed by: DAMIR TURNER  Authorized by: DAMIR TURNER   Interpreted by physician  Comparison: not compared with previous ECG   Rhythm: sinus bradycardia                 ED Course  ED Course   Comment By Time   D/w Dr. Ware's nurse, he would come to see pt in the ED CHANA Noonan 03/10 7437   Dr. Ware reviewed CT and requested US. CHANA Noonan 03/10 1424   D/w Dr. Ware, he is going to take pt for surgery. CHANA Noonan 03/10 4471        Labs Reviewed   COMPREHENSIVE METABOLIC PANEL - Abnormal; Notable for the following:        Result Value    Glucose 130 (*)     Total Bilirubin 1.6 (*)     All other components within normal limits   CBC WITH AUTO DIFFERENTIAL - Abnormal; Notable for the following:     WBC 11.78 (*)     Neutrophil % 88.4 (*)     Lymphocyte % 8.1 (*)     Neutrophils, Absolute 10.42 (*)     All other components within normal limits   URINE DRUG SCREEN - Abnormal; Notable for the following:     Opiate Screen Positive (*)     All other components within normal limits    Narrative:     Negative Thresholds For Drugs Screened in Urine:     Amphetamines          500 ng/ml  Barbiturates          200  ng/ml  Benzodiazepines       200 ng/ml  Cocaine               150 ng/ml  Methadone             300 ng/mL  Opiates               300 ng/mL  Oxycodone             100 ng/mL  THC                   20 ng/mL    The normal value for all drugs tested is negative. This report includes final unconfirmed screening results.  A positive result by this assay can be, at your request, sent to the Reference Lab for confirmation by gas chromatography. Unconfirmed results must not be used for non-medical purposes, such as employment or legal testing. Clinical consideration should be applied to any drug of abuse test result, particularly when unconfirmed results are used.   LIPASE - Normal   RAINBOW DRAW    Narrative:     The following orders were created for panel order [place] Draw.  Procedure                               Abnormality         Status                     ---------                               -----------         ------                     Light Blue Top[[id number]]                                    Final result               Green Top (Gel)[[id number]]                                   Final result               Lavender Top[[id number]]                                      Final result               Gold Top - SST[[id number]]                                    Final result                 Please view results for these tests on the individual orders.   ETHANOL   URINALYSIS W/ CULTURE IF INDICATED   TISSUE EXAM   CBC AND DIFFERENTIAL    Narrative:     The following orders were created for panel order CBC & Differential.  Procedure                               Abnormality         Status                     ---------                               -----------         ------                     CBC Auto Differential[[id number]]         Abnormal            Final result                 Please view results for these tests on the individual orders.   LIGHT BLUE TOP   GREEN TOP   LAVENDER TOP   GOLD TOP - SST       US Abdomen Limited    Final Result   CONCLUSION: Cholelithiasis.      Electronically signed by:  Edison Machado MD  3/10/2017 3:17 PM CST   Workstation: [id number]      CT Abdomen Pelvis Without Contrast   Final Result   CONCLUSION:       1.  No CT evidence of acute intra-abdominal disease.   2.  Questionable cholelithiasis.      Electronically signed by:  Maria L Sams MD  3/10/2017 1:57 PM CST   Workstation: [email or web address]      XR Chest 2 View   Final Result   CONCLUSION: No evidence of active disease.      Electronically signed by:  Edison Machado MD  3/10/2017 12:56 PM CST   Workstation: [email or web address]      FL C Arm During Surgery    (Results Pending)               MDM  Number of Diagnoses or Management Options  Calculus of gallbladder with acute cholecystitis without obstruction: new and requires workup     Amount and/or Complexity of Data Reviewed  Clinical lab tests: reviewed  Tests in the radiology section of CPT®: reviewed  Tests in the medicine section of CPT®: reviewed  Discuss the patient with other providers: yes    Risk of Complications, Morbidity, and/or Mortality  Presenting problems: moderate  Diagnostic procedures: moderate  Management options: moderate    Patient Progress  Patient progress: stable      Final diagnoses:   Calculus of gallbladder with acute cholecystitis without obstruction            CHANA Noonan  03/10/17 3533

## 2017-03-10 NOTE — ANESTHESIA POSTPROCEDURE EVALUATION
Patient: Tam Kowalski    Procedure Summary     Date Anesthesia Start Anesthesia Stop Room / Location    03/10/17 1559 [id number]  [facility] OR 09 / [facility]       Procedure Diagnosis Surgeon Provider    CHOLECYSTECTOMY LAPAROSCOPIC, INTRA OPERATIVE CHOLANGIOGRAM (N/A Abdomen) Acute cholecystitis  (Acute cholecystitis [K81.0]) MD Lilia Lal MD          Anesthesia Type: general  Last vitals  BP      Temp      Pulse     Resp      SpO2        Post Anesthesia Care and Evaluation    Patient location during evaluation: PACU  Patient participation: complete - patient participated  Level of consciousness: awake and alert  Pain score: 0  Pain management: adequate  Airway patency: patent  Anesthetic complications: No anesthetic complications  PONV Status: none  Cardiovascular status: acceptable  Respiratory status: acceptable  Hydration status: acceptable

## 2017-03-11 VITALS
BODY MASS INDEX: 31.83 KG/M2 | OXYGEN SATURATION: 96 % | HEIGHT: 72 IN | SYSTOLIC BLOOD PRESSURE: 131 MMHG | DIASTOLIC BLOOD PRESSURE: 61 MMHG | WEIGHT: 235 LBS | TEMPERATURE: 96.5 F | RESPIRATION RATE: 18 BRPM | HEART RATE: 58 BPM

## 2017-03-11 PROBLEM — K80.00 CALCULUS OF GALLBLADDER WITH ACUTE CHOLECYSTITIS WITHOUT OBSTRUCTION: Status: RESOLVED | Noted: 2017-03-10 | Resolved: 2017-03-11

## 2017-03-11 PROBLEM — K81.0 ACUTE CHOLECYSTITIS: Status: RESOLVED | Noted: 2017-03-10 | Resolved: 2017-03-11

## 2017-03-11 LAB
ALBUMIN SERPL-MCNC: 3.7 G/DL (ref 3.4–4.8)
ALBUMIN/GLOB SERPL: 1.5 G/DL (ref 1.1–1.8)
ALP SERPL-CCNC: 66 U/L (ref 38–126)
ALT SERPL W P-5'-P-CCNC: 61 U/L (ref 21–72)
ANION GAP SERPL CALCULATED.3IONS-SCNC: 9 MMOL/L (ref 5–15)
AST SERPL-CCNC: 36 U/L (ref 17–59)
BILIRUB SERPL-MCNC: 1.5 MG/DL (ref 0.2–1.3)
BUN BLD-MCNC: 9 MG/DL (ref 7–21)
BUN/CREAT SERPL: 12.3 (ref 7–25)
CALCIUM SPEC-SCNC: 8.6 MG/DL (ref 8.4–10.2)
CHLORIDE SERPL-SCNC: 106 MMOL/L (ref 95–110)
CO2 SERPL-SCNC: 25 MMOL/L (ref 22–31)
CREAT BLD-MCNC: 0.73 MG/DL (ref 0.7–1.3)
GFR SERPL CREATININE-BSD FRML MDRD: 123 ML/MIN/1.73 (ref 70–162)
GLOBULIN UR ELPH-MCNC: 2.4 GM/DL (ref 2.3–3.5)
GLUCOSE BLD-MCNC: 109 MG/DL (ref 60–100)
POTASSIUM BLD-SCNC: 4 MMOL/L (ref 3.5–5.1)
PROT SERPL-MCNC: 6.1 G/DL (ref 6.3–8.6)
SODIUM BLD-SCNC: 140 MMOL/L (ref 137–145)

## 2017-03-11 PROCEDURE — 99024 POSTOP FOLLOW-UP VISIT: CPT | Performed by: SURGERY

## 2017-03-11 PROCEDURE — 25010000002 ONDANSETRON PER 1 MG: Performed by: SURGERY

## 2017-03-11 PROCEDURE — 80053 COMPREHEN METABOLIC PANEL: CPT | Performed by: SURGERY

## 2017-03-11 PROCEDURE — 25010000002 CEFOXITIN: Performed by: SURGERY

## 2017-03-11 RX ORDER — HYDROCODONE BITARTRATE AND ACETAMINOPHEN 7.5; 325 MG/1; MG/1
2 TABLET ORAL EVERY 6 HOURS PRN
Qty: 30 TABLET | Refills: 0 | Status: SHIPPED | OUTPATIENT
Start: 2017-03-11 | End: 2017-03-20

## 2017-03-11 RX ORDER — ONDANSETRON 2 MG/ML
4 INJECTION INTRAMUSCULAR; INTRAVENOUS ONCE AS NEEDED
Status: COMPLETED | OUTPATIENT
Start: 2017-03-11 | End: 2017-03-11

## 2017-03-11 RX ADMIN — HYDROCODONE BITARTRATE AND ACETAMINOPHEN 2 TABLET: 7.5; 325 TABLET ORAL at 08:43

## 2017-03-11 RX ADMIN — ONDANSETRON 4 MG: 2 INJECTION INTRAMUSCULAR; INTRAVENOUS at 10:37

## 2017-03-11 RX ADMIN — CEFOXITIN 2 G: 2 INJECTION, POWDER, FOR SOLUTION INTRAVENOUS at 06:31

## 2017-03-11 NOTE — DISCHARGE INSTR - APPOINTMENTS
Dr Ware  2 Weeks  [phone]  Info was sent to the office, if they do not contact you within one business day with appointment info, please call.    PCP  1 Week  [phone]  Info was sent to the office, if they do not contact you within one business day with appointment info, please call and set up an appointment with a primary care doctor.

## 2017-03-11 NOTE — DISCHARGE SUMMARY
Discharge summary.  Date of admission is 3/10/2017.  Date of discharge is 3/11/2016.  Admitting diagnosis is acute cholecystitis.    procedure lap cholecystectomy with cholangiogram  Condition on discharge fair.  Discharge diet regular.  Discharge medications include Norco 7.5 mg pills for total of 30 pills.  Follow-up with me Dr. Waer in 2 weeks.

## 2017-03-13 ENCOUNTER — TELEPHONE (OUTPATIENT)
Dept: SURGERY | Facility: CLINIC | Age: 32
End: 2017-03-13

## 2017-03-13 NOTE — PAYOR COMM NOTE
"Dex Reina Autsin (32 y.o. Male)     Date of Birth Social Security Number Address Home Phone MRN    [id number]  [id number] [facility] [address]  [id number]    Shinto Marital Status          Mandaen        Admission Date Admission Type Admitting Provider Attending Provider Department, Room/Bed    3/10/17 Emergency Nelson Ware MD  [facility] 3 EAST, 365/1    Discharge Date Discharge Disposition Discharge Destination        3/11/2017 Home or Self Care             Attending Provider: (none)    Allergies:  No Known Allergies    Isolation:  None   Infection:  None   Code Status:  Prior    Ht:  72\" (182.9 cm)   Wt:  235 lb (107 kg)    Admission Cmt:  None   Principal Problem:  None                Active Insurance as of 3/10/2017     Primary Coverage     Payor Plan Insurance Group Employer/Plan Group    [employer] PPO [id number]     Payor Plan Address Payor Plan Phone Number Effective From Effective To    [address] [phone] 8/22/2016     [place] [address]       Subscriber Name Subscriber Birth Date Member ID       DEXREINA 1985 [id number]                 Emergency Contacts      (Rel.) Home Phone Work Phone Mobile Phone    Jana Loomis (Significant Other) -- -- [phone]            Insurance Information                [employer] PPO Phone: [phone]    Subscriber: Dex Reina Avila Subscriber#: [id number]    Group#: [id number] Precert#:              History & Physical      Nelson Ware MD at 3/10/2017  3:13 PM              Patient Care Team:  No Known Provider as PCP - General  No ref. provider found  Chief complaint abdominal pain.    Subjective     Patient is a 32 y.o. male presents with 12 hour history of severe right upper quadrant abdominal pain.  This is never happened before.  He has no nausea or vomiting.  He is not hungry.  The pain coming goes to " his back and flank.  It is a constant pain.  Pain medication has helped the pain some.  No change in his bowel function.    Review of Systems   Review of Systems   Constitutional: Negative.    HENT: Negative.    Eyes: Negative.    Respiratory: Negative.    Cardiovascular: Negative.    Gastrointestinal: Positive for abdominal pain.   Endocrine: Negative.    Genitourinary: Negative.    Musculoskeletal: Negative.    Skin: Negative.    Allergic/Immunologic: Negative.    Neurological: Negative.    Hematological: Negative.    Psychiatric/Behavioral: Negative.      History  History reviewed. No pertinent past medical history.  Past Surgical History   Procedure Laterality Date   • Orbital fracture surgery       History reviewed. No pertinent family history.  Social History   Substance Use Topics   • Smoking status: Current Every Day Smoker     Packs/day: 0.50   • Smokeless tobacco: None   • Alcohol use 7.2 oz/week     12 Cans of beer per week       (Not in a hospital admission)  Allergies:  Review of patient's allergies indicates no known allergies.    Objective     Vital Signs  Temp:  [97.7 °F (36.5 °C)] 97.7 °F (36.5 °C)  Heart Rate:  [48-52] 52  Resp:  [16-18] 18  BP: (132-146)/(67-83) 132/67    Physical Exam:    Physical Exam   Constitutional: He is oriented to person, place, and time. He appears well-developed.   HENT:   Head: Normocephalic and atraumatic.   Eyes: Pupils are equal, round, and reactive to light.   Neck: Normal range of motion. Neck supple.   Cardiovascular: Normal rate, regular rhythm, normal heart sounds and intact distal pulses.    Pulmonary/Chest: Effort normal and breath sounds normal.   Abdominal: Soft. Bowel sounds are normal. There is tenderness.   Musculoskeletal: Normal range of motion.   Neurological: He is alert and oriented to person, place, and time.   Skin: Skin is warm and dry.   Psychiatric: He has a normal mood and affect. His behavior is normal.    tenderness to deep palpation in the  right upper quadrant with a positive Parsons sign.    Results Review:      Lab Results (last 24 hours)     Procedure Component Value Units Date/Time    Gower Draw [[id number]] Collected:  03/10/17 1157    Specimen:  Blood Updated:  03/10/17 1200    Narrative:       The following orders were created for panel order Gower Draw.  Procedure                               Abnormality         Status                     ---------                               -----------         ------                     Light Blue Top[[id number]]                                    In process                 Green Top (Gel)[[id number]]                                   In process                 Lavender Top[[id number]]                                      In process                 Gold Top - SST[[id number]]                                    In process                   Please view results for these tests on the individual orders.    Light Blue Top [[id number]] Collected:  03/10/17 1157    Specimen:  Blood Updated:  03/10/17 1200    Green Top (Gel) [[id number]] Collected:  03/10/17 1157    Specimen:  Blood Updated:  03/10/17 1200    Gold Top - SST [[id number]] Collected:  03/10/17 1157    Specimen:  Blood Updated:  03/10/17 1200    Lavender Top [[id number]] Collected:  03/10/17 1157    Specimen:  Blood Updated:  03/10/17 1200    CBC & Differential [[id number]] Collected:  03/10/17 1157    Specimen:  Blood Updated:  03/10/17 1202    Narrative:       The following orders were created for panel order CBC & Differential.  Procedure                               Abnormality         Status                     ---------                               -----------         ------                     CBC Auto Differential[[id number]]         Abnormal            Final result                 Please view results for these tests on the individual orders.    CBC Auto Differential [[id number]]  (Abnormal) Collected:  03/10/17 1157    Specimen:  Blood Updated:  03/10/17 1202      WBC 11.78 (H) 10*3/mm3      RBC 4.88 10*6/mm3      Hemoglobin 13.8 g/dL      Hematocrit 39.9 %      MCV 81.8 fL      MCH 28.3 pg      MCHC 34.6 g/dL      RDW 12.9 %      RDW-SD 38.8 fl      MPV 10.0 fL      Platelets 224 10*3/mm3      Neutrophil % 88.4 (H) %      Lymphocyte % 8.1 (L) %      Monocyte % 3.1 %      Eosinophil % 0.1 %      Basophil % 0.1 %      Immature Grans % 0.2 %      Neutrophils, Absolute 10.42 (H) 10*3/mm3      Lymphocytes, Absolute 0.96 10*3/mm3      Monocytes, Absolute 0.36 10*3/mm3      Eosinophils, Absolute 0.01 10*3/mm3      Basophils, Absolute 0.01 10*3/mm3      Immature Grans, Absolute 0.02 10*3/mm3     Comprehensive Metabolic Panel [[id number]]  (Abnormal) Collected:  03/10/17 1157    Specimen:  Blood Updated:  03/10/17 1227     Glucose 130 (H) mg/dL      BUN 15 mg/dL      Creatinine 0.77 mg/dL      Sodium 139 mmol/L      Potassium 4.4 mmol/L      Chloride 100 mmol/L      CO2 26.0 mmol/L      Calcium 9.7 mg/dL      Total Protein 7.5 g/dL      Albumin 4.70 g/dL      ALT (SGPT) 60 U/L      AST (SGOT) 30 U/L      Alkaline Phosphatase 95 U/L      Total Bilirubin 1.6 (H) mg/dL      eGFR Non African Amer 116 mL/min/1.73      Globulin 2.8 gm/dL      A/G Ratio 1.7 g/dL      BUN/Creatinine Ratio 19.5      Anion Gap 13.0 mmol/L     Lipase [[id number]]  (Normal) Collected:  03/10/17 1157    Specimen:  Blood Updated:  03/10/17 1227     Lipase 47 U/L     Urine Drug Screen [[id number]]  (Abnormal) Collected:  03/10/17 1258    Specimen:  Urine from Urine, Clean Catch Updated:  03/10/17 1333     Amphetamine Screen, Urine Negative      Barbiturates Screen, Urine Negative      Benzodiazepine Screen, Urine Negative      Cocaine Screen, Urine Negative      Methadone Screen, Urine Negative      Opiate Screen Positive (A)      Oxycodone Screen, Urine Negative      THC, Screen, Urine Negative     Narrative:       Negative Thresholds For Drugs Screened in Urine:     Amphetamines          500 ng/ml  Barbiturates           200 ng/ml  Benzodiazepines       200 ng/ml  Cocaine               150 ng/ml  Methadone             300 ng/mL  Opiates               300 ng/mL  Oxycodone             100 ng/mL  THC                   20 ng/mL    The normal value for all drugs tested is negative. This report includes final unconfirmed screening results.  A positive result by this assay can be, at your request, sent to the Reference Lab for confirmation by gas chromatography. Unconfirmed results must not be used for non-medical purposes, such as employment or legal testing. Clinical consideration should be applied to any drug of abuse test result, particularly when unconfirmed results are used.    Ethanol [[id number]] Collected:  03/10/17 1157    Specimen:  Blood Updated:  03/10/17 1425     Ethanol <10 mg/dL      Ethanol % <0.010 %        labs are significant of the total bilirubin 1.6.  CT scan is normal with exception of a possible cholelithiasis.  Next line gallbladder ultrasound was done to confirm and patient has multiple gallstones.  He has mild gallbladder wall thickening without any fluid.  He has a normal common duct diameter.    Assessment/Plan     Active Problems:    Acute cholecystitis    Mr. Kowalski is a 32-year-old gentleman presents with signs symptoms of acute cholecystitis.  We'll give him IV fluids IV antibiotics and taken to the OR for lap cholecystectomy.  He we will do a cholangiogram.  After unexpected at a 1 day hospital stay for symptom control.  The procedure and risks, benefits, and alternatives to the plan him discussed patient and he understands and agrees with the plan.    I discussed the patients findings and my recommendations with patient and family.     Nelson Ware MD  03/10/17  3:16 PM         Electronically signed by Nelson Ware MD at 3/10/2017  3:17 PM           Operative/Procedure Notes (last 72 hours) (Notes from 3/10/2017 12:54 PM through 3/13/2017 12:54 PM)      Nelson Ware MD at 3/10/2017  5:22 PM  Version 1  of 1         CHOLECYSTECTOMY LAPAROSCOPIC  Procedure Note    Tam Kowalski  3/10/2017    Pre-op Diagnosis:   Acute cholecystitis [K81.0]    Post-op Diagnosis:     Post-Op Diagnosis Codes:     * Acute cholecystitis [K81.0]    Procedure/CPT® Codes:  ID LAP,CHOLECYSTECTOMY/GRAPH [01075]    Procedure(s):  CHOLECYSTECTOMY LAPAROSCOPIC, INTRA OPERATIVE CHOLANGIOGRAM    Surgeon(s):  Nelson Ware MD    Anesthesia: General    Staff:   Circulator: Cheyenne Graves RN; Tabby Freeman RN  Scrub Person: Darleen Kowalski  Assistant: Naida Mina CSA    Estimated Blood Loss: * No values recorded between 3/10/2017  3:56 PM and 3/10/2017  5:22 PM *    Specimens:                  ID Type Source Tests Collected by Time Destination   A :  Tissue Gallbladder TISSUE EXAM Nelson Ware MD 3/10/2017 1703          Drains:           Findings: critical view of safety obtained, gallbladder hydrops, acute cholecystitis, IOC showed contrast in the duodenum and no stones    Complications: none     Op note: After consent was obtained patient was taken to the operating room where general endotracheal anesthesia was induced.  Perioperative antibiotics were given and SCDs were placed.  The abdomen was then prepped and draped in normal sterile fashion.  We performed a timeout.  We started with a supraumbilical Min technique.  This is successful and the trochar was placed and we achieved pneumoperitoneum.  We then inserted a camera and placed 3 additional 5 mm ports in the epigastric, right upper quadrant, and flank positions.  We then grasped the fundus and infundibulum of the gallbladder and began our dissection.  All peritoneum and fat was cleared from the critical structures.  The critical view of safety was obtained and the cystic duct, cystic artery, and cystic plate were all identified.  Cholangiogram was performed with a Delgado clamp with good picture and contast filling the duodenum.  Then placed clips once distally and twice  proximally on both the cystic artery and cystic duct and these were both transected.  The cystic duct was large so a endoloop was also used.  Next used the hook electrocautery to take the gallbladder off the liver bed.  This was done safely with minimal bleeding and bile spillage.  After that the gallbladder was placed in the Endo Catch bag and removed from the abdomen.  Then surveilled the rest of the abdomen and there is no excessive blood or bile noted.  The Morison's pouch was washed out and our surgical site looks clean.  The 3 trochars removed under direct vision and all trochars and instruments removed from the abdomen.  We then closed the umbilical site with 0 Vicryl sutures and skin was approximated with 4-0 Monocryl and skin affix.  The procedure terminated patient tolerated the procedure well.    Nelson Ware MD     Date: 3/10/2017  Time: 5:22 PM           Electronically signed by Nelson Ware MD at 3/10/2017  5:25 PM        Physician Progress Notes (last 72 hours) (Notes from 3/10/2017 12:54 PM through 3/13/2017 12:54 PM)     No notes of this type exist for this encounter.           Discharge Summary      Nelson Ware MD at 3/11/2017 10:18 AM          Discharge summary.  Date of admission is 3/10/2017.  Date of discharge is 3/11/2016.  Admitting diagnosis is acute cholecystitis.    procedure lap cholecystectomy with cholangiogram  Condition on discharge fair.  Discharge diet regular.  Discharge medications include Norco 7.5 mg pills for total of 30 pills.  Follow-up with me Dr. Ware in 2 weeks.       Electronically signed by Nelson Ware MD at 3/11/2017 10:20 AM

## 2017-03-14 LAB
LAB AP CASE REPORT: NORMAL
Lab: NORMAL
PATH REPORT.FINAL DX SPEC: NORMAL
PATH REPORT.GROSS SPEC: NORMAL

## 2017-03-17 ENCOUNTER — OFFICE VISIT (OUTPATIENT)
Dept: SURGERY | Facility: CLINIC | Age: 32
End: 2017-03-17

## 2017-03-17 VITALS
HEIGHT: 72 IN | BODY MASS INDEX: 32.37 KG/M2 | WEIGHT: 239 LBS | SYSTOLIC BLOOD PRESSURE: 128 MMHG | DIASTOLIC BLOOD PRESSURE: 72 MMHG

## 2017-03-17 DIAGNOSIS — K80.00 CALCULUS OF GALLBLADDER WITH ACUTE CHOLECYSTITIS WITHOUT OBSTRUCTION: Primary | ICD-10-CM

## 2017-03-17 PROCEDURE — 99024 POSTOP FOLLOW-UP VISIT: CPT | Performed by: SURGERY

## 2017-03-17 NOTE — PROGRESS NOTES
Mr. Kowalski is a 32 gentleman 1 week status post laparoscopic cholecystectomy.  He has no complaints.  His pain is controlled.  He is eating well.    On exam his well-healed incisions.    Pathology review shows chronic cholecystitis.    Mr. Kowalski has a satisfactory postop visit.  No further care necessary for me.  Can return to work in 1 week.

## 2017-03-22 ENCOUNTER — OFFICE VISIT (OUTPATIENT)
Dept: FAMILY MEDICINE CLINIC | Facility: CLINIC | Age: 32
End: 2017-03-22

## 2017-03-22 VITALS
OXYGEN SATURATION: 99 % | WEIGHT: 232 LBS | DIASTOLIC BLOOD PRESSURE: 76 MMHG | HEIGHT: 72 IN | SYSTOLIC BLOOD PRESSURE: 122 MMHG | HEART RATE: 71 BPM | BODY MASS INDEX: 31.42 KG/M2

## 2017-03-22 DIAGNOSIS — Z90.49 S/P CHOLECYSTECTOMY: Primary | ICD-10-CM

## 2017-03-22 PROCEDURE — 99212 OFFICE O/P EST SF 10 MIN: CPT | Performed by: FAMILY MEDICINE

## 2017-03-22 RX ORDER — HYDROCODONE BITARTRATE AND ACETAMINOPHEN 7.5; 325 MG/1; MG/1
TABLET ORAL
COMMUNITY
Start: 2017-03-21 | End: 2022-12-01

## 2017-03-22 NOTE — PROGRESS NOTES
Subjective   Tam Kowalski is a 32 y.o. male.   Pt had cholecystectomy on 3/10/2017 by Dr. Ware. He is doing well. Has no  Complain. 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  His pain is well controlled.  Tolerating by mouth got a problem.    History of Present Illness     The following portions of the patient's history were reviewed and updated as appropriate:   He  has no past medical history on file.  He  does not have any pertinent problems on file.  He  has a past surgical history that includes Orbital fracture repair and lap,cholecystectomy (N/A, 3/10/2017).  His family history is not on file.  He  reports that he has been smoking.  He has been smoking about 0.50 packs per day. He does not have any smokeless tobacco history on file. He reports that he drinks about 7.2 oz of alcohol per week  He reports that he does not use illicit drugs.  Current Outpatient Prescriptions   Medication Sig Dispense Refill   • HYDROcodone-acetaminophen (NORCO) 7.5-325 MG per tablet      • gabapentin (NEURONTIN) 300 MG capsule Take 300 mg by mouth 2 (Two) Times a Day As Needed.       No current facility-administered medications for this visit.      Current Outpatient Prescriptions on File Prior to Visit   Medication Sig   • gabapentin (NEURONTIN) 300 MG capsule Take 300 mg by mouth 2 (Two) Times a Day As Needed.     No current facility-administered medications on file prior to visit.      He has No Known Allergies..    Review of Systems   Constitutional: Negative for chills and fever.   Respiratory: Negative for shortness of breath and wheezing.    Cardiovascular: Negative for chest pain.   Gastrointestinal: Negative for abdominal pain, diarrhea, nausea and vomiting.       Objective    Vitals:    03/22/17 1453   BP: 122/76   Pulse: 71   SpO2: 99%       Physical Exam   Constitutional: He appears well-developed and  well-nourished.   HENT:   Head: Normocephalic.   Right Ear: External ear normal.   Left Ear: External ear normal.   Mouth/Throat: Oropharynx is clear and moist.   Eyes: EOM are normal. Pupils are equal, round, and reactive to light.   Neck: Normal range of motion.   Cardiovascular: Normal rate and regular rhythm.    Pulmonary/Chest: Effort normal.   Skin:   Laparoscopic surgical incision present dry and clean.   Vitals reviewed.      Assessment/Plan   Tam was seen today for follow-up and abdominal pain.    Diagnoses and all orders for this visit:    S/P cholecystectomy    I discussed with Dr. Ware.  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  Still no heavy lifting until patient is cleared by Dr. Ware.        This document has been electronically signed by Nahum Stevens MD on March 22, 2017 3:28 PM

## 2017-03-23 NOTE — PROGRESS NOTES
I have reviewed the notes, assessments, and/or procedures performed. I concur with her/his documentation of Tam Kowalski.     Rolando Schaefer, DO

## 2022-01-04 ENCOUNTER — TRANSCRIBE ORDERS (OUTPATIENT)
Dept: GENERAL RADIOLOGY | Facility: CLINIC | Age: 37
End: 2022-01-04

## 2022-01-04 DIAGNOSIS — Z00.00 ROUTINE GENERAL MEDICAL EXAMINATION AT A HEALTH CARE FACILITY: Primary | ICD-10-CM

## 2022-05-22 PROCEDURE — 87635 SARS-COV-2 COVID-19 AMP PRB: CPT | Performed by: NURSE PRACTITIONER

## (undated) DEVICE — PK LAP CHOLE LF 60

## (undated) DEVICE — GLV SURG SENSICARE GREEN W/ALOE PF LF 8.5 STRL

## (undated) DEVICE — SUT VIC 0/0 UR6 27IN DYED J603H

## (undated) DEVICE — ENDOPOUCH RETRIEVER SPECIMEN RETRIEVAL BAGS: Brand: ENDOPOUCH RETRIEVER

## (undated) DEVICE — GLV SURG SENSICARE GREEN W/ALOE PF LF 6.5 STRL

## (undated) DEVICE — CUFF SCD HEMOFORCE2 CALF MD REFURB

## (undated) DEVICE — SKIN AFFIX SURG ADHESIVE 72/CS 0.55ML: Brand: MEDLINE

## (undated) DEVICE — ENDOPATH XCEL BLADELESS TROCARS WITH STABILITY SLEEVES: Brand: ENDOPATH XCEL

## (undated) DEVICE — GOWN,AURORA,NOREINF,RAGLAN,XL,STERILE: Brand: MEDLINE

## (undated) DEVICE — GLV SURG TRIUMPH NATURAL W/ALOE PF LTX 7.5 STRL

## (undated) DEVICE — PDS II VLT 0 107CM AG ST3: Brand: ENDOLOOP

## (undated) DEVICE — GLV SURG TRIUMPH NATURAL W/ALOE PF LTX 6.5 STRL

## (undated) DEVICE — SOL IRRIG NACL 1000ML

## (undated) DEVICE — MONOPOLAR METZENBAUM SCISSOR TIP, DISPOSABLE: Brand: MONOPOLAR METZENBAUM SCISSOR TIP, DISPOSABLE

## (undated) DEVICE — THE KUMAR CATHETER®, USED IN CONJUNCTION WITH KUMAR CHOLANGIOGRAPHY® CLAMP, IS MEANT TO PROVIDE A MEANS OF LAPAROSCOPIC CHOLANGIOGRAPHY. IT COMPRISES A TRANSLUCENT TUBING ( 76 CM. LENGTH AND 16 GA. ) THAT CARRIES A 19 GA., 1.25 CM LONG NEEDLE AT THE END. THE KUMAR CATHETER® IS USED TO PUNCTURE THE HARTMANN'S POUCH OF THE GALLBLADDER FOR BILIARY ACCESS AND / OR ASPIRATION. PRODUCT IS LATEX FREE.: Brand: KUMAR CATHETER®

## (undated) DEVICE — ENDOPATH XCEL BLUNT TIP TROCARS WITH SMOOTH SLEEVES: Brand: ENDOPATH XCEL

## (undated) DEVICE — GOWN,PREVENTION PLUS,XLNG/XXLARGE,STRL: Brand: MEDLINE

## (undated) DEVICE — GLV SURG TRIUMPH LT PF LTX 8 STRL

## (undated) DEVICE — SUT MONOCRYL 4/0 PS2 27IN Y426H ETY426H